# Patient Record
Sex: FEMALE | Race: BLACK OR AFRICAN AMERICAN
[De-identification: names, ages, dates, MRNs, and addresses within clinical notes are randomized per-mention and may not be internally consistent; named-entity substitution may affect disease eponyms.]

---

## 2019-07-21 ENCOUNTER — HOSPITAL ENCOUNTER (EMERGENCY)
Dept: HOSPITAL 74 - JER | Age: 29
Discharge: HOME | End: 2019-07-21
Payer: COMMERCIAL

## 2019-07-21 VITALS — BODY MASS INDEX: 38 KG/M2

## 2019-07-21 VITALS — DIASTOLIC BLOOD PRESSURE: 77 MMHG | SYSTOLIC BLOOD PRESSURE: 121 MMHG | TEMPERATURE: 98.5 F | HEART RATE: 81 BPM

## 2019-07-21 DIAGNOSIS — K92.1: ICD-10-CM

## 2019-07-21 DIAGNOSIS — R10.9: Primary | ICD-10-CM

## 2019-07-21 LAB
ALBUMIN SERPL-MCNC: 3.6 G/DL (ref 3.4–5)
ALP SERPL-CCNC: 64 U/L (ref 45–117)
ALT SERPL-CCNC: 23 U/L (ref 13–61)
ANION GAP SERPL CALC-SCNC: 5 MMOL/L (ref 8–16)
APPEARANCE UR: CLEAR
AST SERPL-CCNC: 29 U/L (ref 15–37)
BASOPHILS # BLD: 1.4 % (ref 0–2)
BILIRUB SERPL-MCNC: 0.3 MG/DL (ref 0.2–1)
BILIRUB UR STRIP.AUTO-MCNC: NEGATIVE MG/DL
BUN SERPL-MCNC: 12.7 MG/DL (ref 7–18)
CALCIUM SERPL-MCNC: 8.7 MG/DL (ref 8.5–10.1)
CHLORIDE SERPL-SCNC: 106 MMOL/L (ref 98–107)
CO2 SERPL-SCNC: 28 MMOL/L (ref 21–32)
COLOR UR: YELLOW
CREAT SERPL-MCNC: 0.7 MG/DL (ref 0.55–1.3)
DEPRECATED RDW RBC AUTO: 14.9 % (ref 11.6–15.6)
EOSINOPHIL # BLD: 2.2 % (ref 0–4.5)
GLUCOSE SERPL-MCNC: 92 MG/DL (ref 74–106)
HCT VFR BLD CALC: 37 % (ref 32.4–45.2)
HGB BLD-MCNC: 11.9 GM/DL (ref 10.7–15.3)
KETONES UR QL STRIP: NEGATIVE
LEUKOCYTE ESTERASE UR QL STRIP.AUTO: NEGATIVE
LIPASE SERPL-CCNC: 120 U/L (ref 73–393)
LYMPHOCYTES # BLD: 24.7 % (ref 8–40)
MCH RBC QN AUTO: 22 PG (ref 25.7–33.7)
MCHC RBC AUTO-ENTMCNC: 32.2 G/DL (ref 32–36)
MCV RBC: 68.4 FL (ref 80–96)
MONOCYTES # BLD AUTO: 6.5 % (ref 3.8–10.2)
NEUTROPHILS # BLD: 65.2 % (ref 42.8–82.8)
NITRITE UR QL STRIP: NEGATIVE
PH UR: 5.5 [PH] (ref 5–8)
PLATELET # BLD AUTO: 302 K/MM3 (ref 134–434)
PMV BLD: 8 FL (ref 7.5–11.1)
POTASSIUM SERPLBLD-SCNC: 4.4 MMOL/L (ref 3.5–5.1)
PROT SERPL-MCNC: 7.9 G/DL (ref 6.4–8.2)
PROT UR QL STRIP: NEGATIVE
PROT UR QL STRIP: NEGATIVE
RBC # BLD AUTO: 5.41 M/MM3 (ref 3.6–5.2)
SODIUM SERPL-SCNC: 139 MMOL/L (ref 136–145)
SP GR UR: 1.02 (ref 1.01–1.03)
UROBILINOGEN UR STRIP-MCNC: 0.2 MG/DL (ref 0.2–1)
WBC # BLD AUTO: 10.9 K/MM3 (ref 4–10)

## 2019-07-21 PROCEDURE — 3E0337Z INTRODUCTION OF ELECTROLYTIC AND WATER BALANCE SUBSTANCE INTO PERIPHERAL VEIN, PERCUTANEOUS APPROACH: ICD-10-PCS | Performed by: EMERGENCY MEDICINE

## 2019-07-21 PROCEDURE — 3E033GC INTRODUCTION OF OTHER THERAPEUTIC SUBSTANCE INTO PERIPHERAL VEIN, PERCUTANEOUS APPROACH: ICD-10-PCS | Performed by: EMERGENCY MEDICINE

## 2019-07-21 PROCEDURE — 3E0333Z INTRODUCTION OF ANTI-INFLAMMATORY INTO PERIPHERAL VEIN, PERCUTANEOUS APPROACH: ICD-10-PCS | Performed by: EMERGENCY MEDICINE

## 2019-07-21 NOTE — PDOC
History of Present Illness





- General


Chief Complaint: Pain


Stated Complaint: ABD PAIN / BLOODY STOOL


Time Seen by Provider: 07/21/19 09:37


History Source: Patient


Exam Limitations: No Limitations





- History of Present Illness


Travel History: No


Initial Comments: 





07/21/19 10:46


28-year-old female presents the emergency room with complaints of upper 

abdominal sharp pain since last evening now associated with 3 episodes of brown 

soft stool last one containing blood patient denies fever, chills, recent 

constipation, recent travel recent illness. Patient does state history of 

colitis. Patient has no urinary complaints but does complain of nausea since 

this morning. Patient unsure if she ate food that was bad at of [employer] buffet 

last night


Timing/Duration: reports: constant


Quality: reports: mild, burning, stabbing


Abdominal Pain Onset Location: reports: epigastric


Pain Radiation: reports: no radiation


Activities at Onset: reports: none


Aggravating Factors: improves with: None


Alleviating Factors: improves with: None





Past History





- Travel


Traveled outside of the country in the last 30 days: No


Close contact w/someone who was outside of country & ill: No





- Past Medical History


Allergies/Adverse Reactions: 


 Allergies











Allergy/AdvReac Type Severity Reaction Status Date / Time


 


No Known Allergies Allergy   Verified 07/21/19 09:38











Home Medications: 


Ambulatory Orders





NK [No Known Home Medication]  07/21/19 








Cancer:  (LEUKEMIA)


COPD: No


GI Disorders: Yes (colitis)





- Immunization History


Immunization Up to Date: Yes





- Suicide/Smoking/Psychosocial Hx


Smoking History: Never smoked


Have you smoked in the past 12 months: No


Hx Alcohol Use: Yes (social)


Drug/Substance Use Hx: No


Patient Lives Alone: No


Lives with/in: spouse/SO





Abd/GI Specific PMHX





- Complaint Specific PMHX


Colitis: Yes


GERD: No





**Review of Systems





- Review of Systems


Able to Perform ROS?: Yes


Constitutional: No: Symptoms Reported


HEENTM: No: Symptoms Reported


Respiratory: No: Symptoms reported


Cardiac (ROS): No: Symptoms Reported


ABD/GI: Yes: Blood Streaked Bowels, Nausea, Abdominal cramping


: No: Symptoms Reported


Musculoskeletal: No: Symptoms Reported


Integumentary: No: Symptoms Reported


Neurological: No: Symptoms reported


Hematologic/Lymphatic: No: Symptoms Reported





*Physical Exam





- Vital Signs


 Last Vital Signs











Temp Pulse Resp BP Pulse Ox


 


 98.5 F   81   18   121/77   98 


 


 07/21/19 09:35  07/21/19 09:35  07/21/19 09:35  07/21/19 09:35  07/21/19 09:35














- Physical Exam


General Appearance: Yes: Nourished, Appropriately Dressed.  No: Apparent 

Distress


HEENT: negative: Pale Conjunctivae


Neck: positive: Normal Thyroid


Respiratory/Chest: positive: Lungs Clear, Normal Breath Sounds.  negative: 

Respiratory Distress, Accessory Muscle Use


Cardiovascular: positive: Regular Rhythm, Regular Rate.  negative: Murmur


Gastrointestinal/Abdominal: positive: Soft, Tenderness (epigastric right upper 

quadrant)


Rectal Exam: positive: normal exam (brown stool on withdrawn glove), normal 

rectal tone.  negative: hemorrhoids


Musculoskeletal: negative: CVA Tenderness


Extremity: positive: Normal Capillary Refill


Integumentary: positive: Normal Color, Warm, Moist


Neurologic: positive: Motor Strength 5/5 (ambulatory)





ED Treatment Course





- LABORATORY


CBC & Chemistry Diagram: 


 07/21/19 09:50





 07/21/19 09:50





- ADDITIONAL ORDERS


Additional order review: 


 Laboratory  Results











  07/21/19





  10:00


 


Stool Occult Blood  Positive








 











  07/21/19





  09:50


 


RBC  5.41 H


 


MCV  68.4 L


 


MCHC  32.2


 


RDW  14.9


 


MPV  8.0


 


Neutrophils %  65.2  D


 


Lymphocytes %  24.7


 


Monocytes %  6.5


 


Eosinophils %  2.2


 


Basophils %  1.4














- Medications


Given in the ED: 


ED Medications














Discontinued Medications














Generic Name Dose Route Start Last Admin





  Trade Name Freq  PRN Reason Stop Dose Admin


 


Pantoprazole Sodium 40 mg/  100 mls @ 200 mls/hr  07/21/19 09:59  07/21/19 10:14





  Sodium Chloride  IVPB  07/21/19 10:28  200 mls/hr





  ONCE ONE   Administration





     





     





     





     


 


Ondansetron HCl  4 mg  07/21/19 09:59  07/21/19 10:14





  Zofran Injection  IVPUSH  07/21/19 10:00  4 mg





  ONCE ONE   Administration





     





     





     





     














Medical Decision Making





- Medical Decision Making





07/21/19 10:00


Chief complaint: Patient with upper abdominal sharp pain associated with nausea 

and one episode of bloody stool this morning. Patient history of colitis and 

unsure if history of gallstones.


Exam epigastric and right upper quadrant tenderness vital signs stable


Plan: urine, labs, IV fluids Zofran and protonix ordered


07/21/19 10:52


 Laboratory Tests











  07/21/19 07/21/19 07/21/19





  09:50 09:50 10:00


 


WBC  10.9 H  


 


Hgb  11.9  


 


Hct  37.0  


 


MCV  68.4 L  


 


Neutrophils %  65.2  D  


 


Sodium   139 


 


Potassium   4.4 


 


Chloride   106 


 


Carbon Dioxide   28 


 


Anion Gap   5 L 


 


BUN   12.7 


 


Creatinine   0.7 


 


Random Glucose   92 


 


AST   29 


 


ALT   23 


 


Lipase   120 


 


Urine HCG, Qual   


 


Stool Occult Blood    Positive














  07/21/19





  10:10


 


WBC 


 


Hgb 


 


Hct 


 


MCV 


 


Neutrophils % 


 


Sodium 


 


Potassium 


 


Chloride 


 


Carbon Dioxide 


 


Anion Gap 


 


BUN 


 


Creatinine 


 


Random Glucose 


 


AST 


 


ALT 


 


Lipase 


 


Urine HCG, Qual  Negative


 


Stool Occult Blood 











07/21/19 10:52


Patient still Complaining of nausea. Will order Reglan.


07/21/19 12:09


 Laboratory Tests











  07/21/19





  10:10


 


Urine Ketones  Negative


 


Urine Blood  Negative


 


Urine Nitrite  Negative


 


Urine Bilirubin  Negative


 


Ur Leukocyte Esterase  Negative








pt states is feeling better. Will dc home with zofran





*DC/Admit/Observation/Transfer


Diagnosis at time of Disposition: 


 Abdominal pain, Blood in stool








- Discharge Dispostion


Disposition: HOME


Condition at time of disposition: Improved





- Referrals





- Patient Instructions


Printed Discharge Instructions:  DI for Abdominal Pain-Adult, [place] Diet


Additional Instructions: 


At this time your blood work and urine were normal.


There was small amount of blood noted in your stool so I recommend following a 

bland diet for the next 48 hours and then advance as tolerated.


May take Zofran as needed for nausea.


If you develop fever, severe abd pain, or worsening rectal bleeding, return to 

the ED








- Post Discharge Activity